# Patient Record
Sex: MALE | Race: WHITE | NOT HISPANIC OR LATINO | Employment: UNEMPLOYED | URBAN - METROPOLITAN AREA
[De-identification: names, ages, dates, MRNs, and addresses within clinical notes are randomized per-mention and may not be internally consistent; named-entity substitution may affect disease eponyms.]

---

## 2019-02-24 ENCOUNTER — OFFICE VISIT (OUTPATIENT)
Dept: URGENT CARE | Facility: PHYSICIAN GROUP | Age: 29
End: 2019-02-24
Payer: COMMERCIAL

## 2019-02-24 ENCOUNTER — HOSPITAL ENCOUNTER (OUTPATIENT)
Dept: RADIOLOGY | Facility: MEDICAL CENTER | Age: 29
End: 2019-02-24
Attending: PHYSICIAN ASSISTANT
Payer: COMMERCIAL

## 2019-02-24 VITALS
HEART RATE: 75 BPM | HEIGHT: 74 IN | OXYGEN SATURATION: 98 % | BODY MASS INDEX: 23.74 KG/M2 | RESPIRATION RATE: 16 BRPM | SYSTOLIC BLOOD PRESSURE: 124 MMHG | TEMPERATURE: 97.8 F | WEIGHT: 185 LBS | DIASTOLIC BLOOD PRESSURE: 80 MMHG

## 2019-02-24 DIAGNOSIS — M25.562 ACUTE PAIN OF LEFT KNEE: ICD-10-CM

## 2019-02-24 PROCEDURE — 73562 X-RAY EXAM OF KNEE 3: CPT | Mod: LT

## 2019-02-24 PROCEDURE — 99203 OFFICE O/P NEW LOW 30 MIN: CPT | Performed by: PHYSICIAN ASSISTANT

## 2019-02-24 RX ORDER — HYDROCODONE BITARTRATE AND ACETAMINOPHEN 5; 325 MG/1; MG/1
1 TABLET ORAL EVERY 6 HOURS PRN
Qty: 12 TAB | Refills: 0 | Status: SHIPPED | OUTPATIENT
Start: 2019-02-24 | End: 2019-03-03

## 2019-02-24 RX ORDER — METHYLPREDNISOLONE 4 MG/1
TABLET ORAL
Qty: 1 KIT | Refills: 0 | Status: SHIPPED | OUTPATIENT
Start: 2019-02-24 | End: 2019-02-24 | Stop reason: SDUPTHER

## 2019-02-24 RX ORDER — KETOROLAC TROMETHAMINE 30 MG/ML
60 INJECTION, SOLUTION INTRAMUSCULAR; INTRAVENOUS ONCE
Status: COMPLETED | OUTPATIENT
Start: 2019-02-24 | End: 2019-02-24

## 2019-02-24 RX ORDER — METHYLPREDNISOLONE 4 MG/1
TABLET ORAL
Qty: 1 KIT | Refills: 0 | Status: SHIPPED | OUTPATIENT
Start: 2019-02-24

## 2019-02-24 RX ADMIN — KETOROLAC TROMETHAMINE 60 MG: 30 INJECTION, SOLUTION INTRAMUSCULAR; INTRAVENOUS at 17:30

## 2019-02-24 ASSESSMENT — PAIN SCALES - GENERAL: PAINLEVEL: 6=MODERATE PAIN

## 2019-02-28 ASSESSMENT — ENCOUNTER SYMPTOMS
CHILLS: 0
DIZZINESS: 0
SHORTNESS OF BREATH: 0
BACK PAIN: 0
VOMITING: 0
ABDOMINAL PAIN: 0
WEAKNESS: 0
DIARRHEA: 0
FEVER: 0
NAUSEA: 0
NUMBNESS: 0

## 2019-02-28 NOTE — PROGRESS NOTES
"Subjective:      Dheeraj Fry is a 28 y.o. male who presents with Knee Pain (Left knee x 2 weeks)            Knee Pain   This is a new problem. The current episode started 1 to 4 weeks ago (2 weeks). The problem occurs constantly. The problem has been gradually worsening. Pertinent negatives include no abdominal pain, chest pain, chills, congestion, fever, nausea, numbness, rash, vomiting or weakness. The symptoms are aggravated by walking and standing. He has tried NSAIDs for the symptoms. The treatment provided mild relief.     Patient presents to urgent care reporting a 2 week history of worsening left knee pain. He denies any recent falls or trauma. No history of the same. No redness or swelling of the knee. He has been taking OTC nsaids without significant relief.     Review of Systems   Constitutional: Negative for chills and fever.   HENT: Negative for congestion.    Respiratory: Negative for shortness of breath.    Cardiovascular: Negative for chest pain.   Gastrointestinal: Negative for abdominal pain, diarrhea, nausea and vomiting.   Genitourinary: Negative.    Musculoskeletal: Negative for back pain and joint pain.        + knee pain   Skin: Negative for rash.   Neurological: Negative for dizziness, weakness and numbness.        Objective:     /80   Pulse 75   Temp 36.6 °C (97.8 °F) (Temporal)   Resp 16   Ht 1.88 m (6' 2\")   Wt 83.9 kg (185 lb)   SpO2 98%   BMI 23.75 kg/m²      Physical Exam   Constitutional: He is oriented to person, place, and time. He appears well-developed and well-nourished. He appears distressed.   HENT:   Head: Normocephalic and atraumatic.   Eyes: Pupils are equal, round, and reactive to light.   Cardiovascular: Normal rate.    Pulmonary/Chest: Effort normal.   Musculoskeletal: Normal range of motion.        Left knee: He exhibits normal range of motion, no swelling, no effusion, no LCL laxity and no MCL laxity. Tenderness found. No medial joint line and no lateral " joint line tenderness noted.        Legs:  Tenderness to palpation over anterior aspect of left knee.    Neurological: He is alert and oriented to person, place, and time.   Skin: He is not diaphoretic.   Psychiatric: He has a normal mood and affect. His behavior is normal.   Nursing note and vitals reviewed.         PMH:  has no past medical history on file.  MEDS:   Current Outpatient Prescriptions:   •  HYDROcodone-acetaminophen (NORCO) 5-325 MG Tab per tablet, Take 1 Tab by mouth every 6 hours as needed for up to 7 days., Disp: 12 Tab, Rfl: 0  •  MethylPREDNISolone (MEDROL DOSEPAK) 4 MG Tablet Therapy Pack, Take as directed, Disp: 1 Kit, Rfl: 0  •  Loratadine (CLARITIN PO), Take  by mouth., Disp: , Rfl:   •  azithromycin (ZITHROMAX) 250 MG Tab, Take 2 tablets on day 1 then 1 tablet on day 2, 3, 4, 5, Disp: 6 Tab, Rfl: 0  •  guaifenesin-codeine (TUSSI-ORGANIDIN NR) 100-10 MG/5ML syrup, Take 5 mL by mouth 3 times a day as needed for Cough., Disp: 120 mL, Rfl: 0  ALLERGIES: No Known Allergies  SURGHX: History reviewed. No pertinent surgical history.  SOCHX:  reports that he has been smoking Cigarettes.  He has a 2.50 pack-year smoking history. He has never used smokeless tobacco. He reports that he drinks about 4.2 - 8.4 oz of alcohol per week . He reports that he uses drugs, including Marijuana.  FH: family history is not on file.     Assessment/Plan:     1. Acute pain of left knee  - ketorolac (TORADOL) injection 60 mg; 2 mL by Intramuscular route Once.   - Given in urgent care with moderate relief of pain after 10 minutes  - DX-KNEE 3 VIEWS LEFT; Future  Impression       No evidence of acute fracture or dislocation.       - HYDROcodone-acetaminophen (NORCO) 5-325 MG Tab per tablet; Take 1 Tab by mouth every 6 hours as needed for up to 7 days.  Dispense: 12 Tab; Refill: 0  - Consent for Opiate Prescription  - MethylPREDNISolone (MEDROL DOSEPAK) 4 MG Tablet Therapy Pack; Take as directed  Dispense: 1 Kit; Refill:  0    Encouraged icing 2-3 times daily and use of compressive knee brace as needed. OTC tylenol as needed for mild-moderate pain while taking oral course of steroids. Norco given to take for severe pain. He states he has already set up an appointment with [facility] orthopedics and will follow up next week if needed. The patient demonstrated a good understanding and agreed with the treatment plan.

## 2019-03-10 ENCOUNTER — HOSPITAL ENCOUNTER (OUTPATIENT)
Dept: RADIOLOGY | Facility: MEDICAL CENTER | Age: 29
End: 2019-03-10
Attending: NEUROLOGICAL SURGERY
Payer: COMMERCIAL

## 2019-03-10 DIAGNOSIS — M54.5 LOW BACK PAIN, UNSPECIFIED BACK PAIN LATERALITY, UNSPECIFIED CHRONICITY, WITH SCIATICA PRESENCE UNSPECIFIED: ICD-10-CM

## 2019-03-10 PROCEDURE — 72100 X-RAY EXAM L-S SPINE 2/3 VWS: CPT

## 2019-04-18 ENCOUNTER — HOSPITAL ENCOUNTER (OUTPATIENT)
Dept: RADIOLOGY | Facility: MEDICAL CENTER | Age: 29
End: 2019-04-18
Attending: PHYSICIAN ASSISTANT
Payer: COMMERCIAL

## 2019-04-18 DIAGNOSIS — M54.16 LUMBAR RADICULOPATHY: ICD-10-CM

## 2019-04-18 PROCEDURE — 72148 MRI LUMBAR SPINE W/O DYE: CPT

## 2019-04-18 PROCEDURE — 72100 X-RAY EXAM L-S SPINE 2/3 VWS: CPT

## 2022-09-17 ENCOUNTER — APPOINTMENT (EMERGENCY)
Dept: RADIOLOGY | Facility: HOSPITAL | Age: 32
End: 2022-09-17
Attending: EMERGENCY MEDICINE
Payer: COMMERCIAL

## 2022-09-17 ENCOUNTER — HOSPITAL ENCOUNTER (EMERGENCY)
Facility: HOSPITAL | Age: 32
Discharge: HOME | End: 2022-09-17
Attending: EMERGENCY MEDICINE | Admitting: EMERGENCY MEDICINE
Payer: COMMERCIAL

## 2022-09-17 VITALS
HEART RATE: 70 BPM | OXYGEN SATURATION: 99 % | RESPIRATION RATE: 16 BRPM | DIASTOLIC BLOOD PRESSURE: 83 MMHG | SYSTOLIC BLOOD PRESSURE: 122 MMHG | TEMPERATURE: 98.5 F

## 2022-09-17 DIAGNOSIS — J01.00 ACUTE MAXILLARY SINUSITIS, RECURRENCE NOT SPECIFIED: Primary | ICD-10-CM

## 2022-09-17 LAB
ALBUMIN SERPL-MCNC: 5.1 G/DL (ref 3.4–5)
ALP SERPL-CCNC: 38 IU/L (ref 35–126)
ALT SERPL-CCNC: 16 IU/L (ref 16–63)
ANION GAP SERPL CALC-SCNC: 8 MEQ/L (ref 3–15)
AST SERPL-CCNC: 19 IU/L (ref 15–41)
BASOPHILS # BLD: 0.06 K/UL (ref 0.01–0.1)
BASOPHILS NFR BLD: 0.7 %
BILIRUB SERPL-MCNC: 0.8 MG/DL (ref 0.3–1.2)
BUN SERPL-MCNC: 15 MG/DL (ref 8–20)
CALCIUM SERPL-MCNC: 9.7 MG/DL (ref 8.9–10.3)
CHLORIDE SERPL-SCNC: 101 MEQ/L (ref 98–109)
CO2 SERPL-SCNC: 25 MEQ/L (ref 22–32)
CREAT SERPL-MCNC: 0.7 MG/DL (ref 0.8–1.3)
DIFFERENTIAL METHOD BLD: ABNORMAL
EOSINOPHIL # BLD: 0.19 K/UL (ref 0.04–0.54)
EOSINOPHIL NFR BLD: 2.4 %
ERYTHROCYTE [DISTWIDTH] IN BLOOD BY AUTOMATED COUNT: 11.8 % (ref 11.6–14.4)
GFR SERPL CREATININE-BSD FRML MDRD: >60 ML/MIN/1.73M*2
GLUCOSE SERPL-MCNC: 95 MG/DL (ref 70–99)
HCT VFR BLDCO AUTO: 45.7 % (ref 40.1–51)
HGB BLD-MCNC: 15.7 G/DL (ref 13.7–17.5)
IMM GRANULOCYTES # BLD AUTO: 0.02 K/UL (ref 0–0.08)
IMM GRANULOCYTES NFR BLD AUTO: 0.2 %
LYMPHOCYTES # BLD: 2.01 K/UL (ref 1.2–3.5)
LYMPHOCYTES NFR BLD: 25.1 %
MCH RBC QN AUTO: 33.7 PG (ref 28–33.2)
MCHC RBC AUTO-ENTMCNC: 34.4 G/DL (ref 32.2–36.5)
MCV RBC AUTO: 98.1 FL (ref 83–98)
MONOCYTES # BLD: 0.61 K/UL (ref 0.3–1)
MONOCYTES NFR BLD: 7.6 %
NEUTROPHILS # BLD: 5.12 K/UL (ref 1.7–7)
NEUTS SEG NFR BLD: 64 %
NRBC BLD-RTO: 0 %
PDW BLD AUTO: 11.2 FL (ref 9.4–12.4)
PLATELET # BLD AUTO: 173 K/UL (ref 150–350)
POTASSIUM SERPL-SCNC: 3.9 MEQ/L (ref 3.6–5.1)
PROT SERPL-MCNC: 7.9 G/DL (ref 6–8.2)
RBC # BLD AUTO: 4.66 M/UL (ref 4.5–5.8)
SODIUM SERPL-SCNC: 134 MEQ/L (ref 136–144)
WBC # BLD AUTO: 8.01 K/UL (ref 3.8–10.5)

## 2022-09-17 PROCEDURE — G1004 CDSM NDSC: HCPCS

## 2022-09-17 PROCEDURE — 36415 COLL VENOUS BLD VENIPUNCTURE: CPT | Performed by: PHYSICIAN ASSISTANT

## 2022-09-17 PROCEDURE — 99284 EMERGENCY DEPT VISIT MOD MDM: CPT

## 2022-09-17 PROCEDURE — 80053 COMPREHEN METABOLIC PANEL: CPT | Performed by: PHYSICIAN ASSISTANT

## 2022-09-17 PROCEDURE — 85025 COMPLETE CBC W/AUTO DIFF WBC: CPT | Performed by: PHYSICIAN ASSISTANT

## 2022-09-17 RX ORDER — POLYMYXIN B SULFATE AND TRIMETHOPRIM 1; 10000 MG/ML; [USP'U]/ML
1 SOLUTION OPHTHALMIC
Qty: 10 ML | Refills: 0 | Status: SHIPPED | OUTPATIENT
Start: 2022-09-17 | End: 2022-09-24

## 2022-09-17 RX ORDER — AMOXICILLIN AND CLAVULANATE POTASSIUM 875; 125 MG/1; MG/1
1 TABLET, FILM COATED ORAL 2 TIMES DAILY
Qty: 20 TABLET | Refills: 0 | Status: SHIPPED | OUTPATIENT
Start: 2022-09-17 | End: 2022-09-27

## 2022-09-17 ASSESSMENT — ENCOUNTER SYMPTOMS
WEAKNESS: 0
FEVER: 0
EYE REDNESS: 0
RHINORRHEA: 0
PHOTOPHOBIA: 0
LIGHT-HEADEDNESS: 0
EYE PAIN: 1
EYE DISCHARGE: 1
COLOR CHANGE: 0

## 2022-09-17 ASSESSMENT — VISUAL ACUITY: OU: 1

## 2022-09-18 NOTE — ED PROVIDER NOTES
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 States he developed some tenderness and mild swelling below his eye and went to  who instructed pt to come to ED for a CT scan  Relieved by:  Nothing tried   Worsened by:  Palpation of area   Associated symptoms: no congestion, no fever, no rash and no rhinorrhea          Patient History   PAST HISTORY     Reviewed from Nursing Triage:         History reviewed. No pertinent past medical history.    Past Surgical History:   Procedure Laterality Date    FACIAL RECONSTRUCTION SURGERY Left 2008       History reviewed. No pertinent family history.    Social History     Tobacco Use    Smoking status: Current Every Day Smoker     Packs/day: 1.00    Smokeless tobacco: Former User   Substance Use Topics    Alcohol use: Yes     Comment: wine daily    Drug use: Not Currently         Review of Systems   REVIEW OF SYSTEMS     Review of Systems   Constitutional: Negative for fever.   HENT: Negative for congestion and rhinorrhea.    Eyes: Positive for pain and discharge. Negative for photophobia, redness and visual disturbance.   Skin: Negative for color change and rash.   Neurological: Negative for weakness and light-headedness.         VITALS     ED Vitals    Date/Time Temp Pulse Resp BP SpO2 [facility]   09/17/22 1747 36.9 °C (98.5 °F) 77 16 123/82 99 % MK        Pulse Ox %: 99 % (09/17/22 2018)  Pulse Ox Interpretation: Normal (09/17/22 2018)           Physical Exam   PHYSICAL EXAM     Physical Exam  Constitutional:       Appearance: He is not ill-appearing or toxic-appearing.   HENT:      Head: Normocephalic.   Eyes:      General: Lids are normal. Vision grossly intact.          Left eye: No discharge.      Extraocular Movements: Extraocular movements intact.      Left eye: Normal extraocular motion and no nystagmus.      Conjunctiva/sclera: Conjunctivae normal.      Left eye: Left conjunctiva is not injected. No chemosis or exudate.     Pupils: Pupils are equal, round, and reactive to light.      Comments: Mild infraorbital edema    Cardiovascular:      Rate and Rhythm: Normal rate and regular rhythm.   Pulmonary:      Effort: Pulmonary effort is normal. No respiratory distress.   Skin:     General: Skin is warm and dry.      Findings: No rash.   Neurological:      General: No focal deficit present.      Mental Status: He is alert and oriented to person, place, and time.           PROCEDURES     Procedures     DATA     Results     Procedure Component Value Units Date/Time    Comprehensive metabolic panel [[id number]]  (Abnormal) Collected: 09/17/22 1803    Specimen: Blood, Venous Updated: 09/17/22 1841     Sodium 134 mEQ/L      Potassium 3.9 mEQ/L      Comment: Results obtained on plasma. Plasma Potassium values may be up to 0.4 mEQ/L less than serum values. The differences may be greater for patients with high platelet or white cell counts.        Chloride 101 mEQ/L      CO2 25 mEQ/L      BUN 15 mg/dL      Creatinine 0.7 mg/dL      Glucose 95 mg/dL      Calcium 9.7 mg/dL      AST (SGOT) 19 IU/L      ALT (SGPT) 16 IU/L      Alkaline Phosphatase 38 IU/L      Total Protein 7.9 g/dL      Comment: Test performed on plasma which typically contains approximately 0.4 g/dL more protein than serum.        Albumin 5.1 g/dL      Bilirubin, Total 0.8 mg/dL      eGFR >60.0 mL/min/1.73m*2      Anion Gap 8 mEQ/L     CBC and differential [[id number]]  (Abnormal) Collected: 09/17/22 1803    Specimen: Blood, Venous Updated: 09/17/22 1808     WBC 8.01 K/uL      RBC 4.66 M/uL      Hemoglobin 15.7 g/dL      Hematocrit 45.7 %      MCV 98.1 fL      MCH 33.7 pg      MCHC 34.4 g/dL      RDW 11.8 %       Platelets 173 K/uL      MPV 11.2 fL      Differential Type Auto     nRBC 0.0 %      Immature Granulocytes 0.2 %      Neutrophils 64.0 %      Lymphocytes 25.1 %      Monocytes 7.6 %      Eosinophils 2.4 %      Basophils 0.7 %      Immature Granulocytes, Absolute 0.02 K/uL      Neutrophils, Absolute 5.12 K/uL      Lymphocytes, Absolute 2.01 K/uL      Monocytes, Absolute 0.61 K/uL      Eosinophils, Absolute 0.19 K/uL      Basophils, Absolute 0.06 K/uL           Imaging Results          CT FACIAL BONES WITHOUT IV CONTRAST (Final result)  Result time 09/17/22 19:27:55    Final result                 Impression:    IMPRESSION:  Mild mucosal thickening left maxillary sinus.  Otherwise, no acute abnormality appreciated.                       Narrative:    CLINICAL HISTORY:  Maxillofacial pain  left infraorbital pain and swelling        TECHNIQUE:  Helical acquisition of the Max face without intravenous contrast.  Coronal and sagittal reformats also obtained.      CT DOSE:  One or more dose reduction techniques (e.g. automated exposure  control, adjustment of the mA and/or kV according to patient size, use of  iterative reconstruction technique) utilized for this examination    COMPARISON: None    FINDINGS:    Streak artifact from dental amalgam as well as left orbital an maxillary  surgical hardware.  Hardware is intact.  No evidence of an acute fracture.  Alignment within normal limits.  There is mild mucosal thickening left maxillary sinus.  Paranasal sinuses  otherwise clear.  No acute abnormality either orbit.  No collection.  No evidence of a mass.                                  No orders to display       Scoring tools                                  ED Course & MDM   MDM / ED COURSE / CLINICAL IMPRESSION / DISPO     MDM       Clinical Impression      Acute maxillary sinusitis, recurrence not specified     Disposition  Discharge         Gladys Wright PA C  09/17/22 2025

## 2022-09-18 NOTE — ED ATTESTATION NOTE
The patient was evaluated and managed by the physician assistant / nurse practitioner.     Ashley Quiroz, DO  09/18/22 1247

## 2025-04-11 ENCOUNTER — HOSPITAL ENCOUNTER (EMERGENCY)
Facility: HOSPITAL | Age: 35
Discharge: HOME | End: 2025-04-11
Attending: EMERGENCY MEDICINE | Admitting: EMERGENCY MEDICINE
Payer: COMMERCIAL

## 2025-04-11 VITALS
TEMPERATURE: 98 F | RESPIRATION RATE: 18 BRPM | HEIGHT: 72 IN | DIASTOLIC BLOOD PRESSURE: 88 MMHG | SYSTOLIC BLOOD PRESSURE: 140 MMHG | HEART RATE: 87 BPM | WEIGHT: 200 LBS | BODY MASS INDEX: 27.09 KG/M2 | OXYGEN SATURATION: 97 %

## 2025-04-11 DIAGNOSIS — L02.01 FACIAL ABSCESS: Primary | ICD-10-CM

## 2025-04-11 PROCEDURE — 0H91XZZ DRAINAGE OF FACE SKIN, EXTERNAL APPROACH: ICD-10-PCS | Performed by: EMERGENCY MEDICINE

## 2025-04-11 PROCEDURE — 10060 I&D ABSCESS SIMPLE/SINGLE: CPT

## 2025-04-11 PROCEDURE — 63700000 HC SELF-ADMINISTRABLE DRUG

## 2025-04-11 PROCEDURE — 99283 EMERGENCY DEPT VISIT LOW MDM: CPT | Mod: 25

## 2025-04-11 RX ORDER — CEPHALEXIN 500 MG/1
500 CAPSULE ORAL ONCE
Status: COMPLETED | OUTPATIENT
Start: 2025-04-11 | End: 2025-04-11

## 2025-04-11 RX ORDER — MELOXICAM 15 MG/1
15 TABLET ORAL
COMMUNITY
Start: 2024-12-16

## 2025-04-11 RX ORDER — CEPHALEXIN 500 MG/1
500 CAPSULE ORAL 4 TIMES DAILY
Qty: 28 CAPSULE | Refills: 0 | Status: SHIPPED | OUTPATIENT
Start: 2025-04-11 | End: 2025-04-18

## 2025-04-11 RX ORDER — CYCLOBENZAPRINE HCL 5 MG
5 TABLET ORAL
COMMUNITY
Start: 2024-12-16

## 2025-04-11 RX ADMIN — CEPHALEXIN 500 MG: 500 CAPSULE ORAL at 15:26

## 2025-04-11 ASSESSMENT — ENCOUNTER SYMPTOMS
WOUND: 1
BRUISES/BLEEDS EASILY: 0
CHILLS: 0
FEVER: 0

## 2025-04-11 NOTE — DISCHARGE INSTRUCTIONS
You are prescribed antibiotics, please finish out the full course of these antibiotics.    Return to the ED for worsening of symptoms or any problems or concerns.  It is very important to follow up with your healthcare provider for re-evaluation.

## 2025-04-11 NOTE — ED ATTESTATION NOTE
I have personally seen and examined the patient.  I reviewed and agree with physician assistant / nurse practitioners assessment and plan of care, with the following exceptions: None    I personally performed the key components of the encounter and provided a substantive portion of the care and medical decision making    My examination, assessment, and plan of care of Juan Carlos Sigala is as follows:     Pt has had a small lump under left eye since surgery years ago.  Over the last few days, has been more swollen.  Exam: 4 to 5 mm pustule under left eye.       Sabas Díaz MD  04/11/25 8790

## 2025-04-11 NOTE — ED PROVIDER NOTES
Emergency Medicine Note  HPI   HISTORY OF PRESENT ILLNESS     HPI  34-year-old male presents to the ED for evaluation of facial wound.  Patient reports he had reconstructive surgery on the left side of his face.  Developed a lump on his left cheek following the surgery which he reports he has always had.  Over the last 4 days he reports it has become more inflamed and now looks infected.  Denies fevers or chills.      Patient History   PAST HISTORY     Reviewed from Nursing Triage:       History reviewed. No pertinent past medical history.    Past Surgical History   Procedure Laterality Date    Facial reconstruction surgery Left 2008       No family history on file.    Social History     Tobacco Use    Smoking status: Every Day     Current packs/day: 1.00     Types: Cigarettes    Smokeless tobacco: Former   Substance Use Topics    Alcohol use: Yes     Comment: wine daily    Drug use: Not Currently         Review of Systems   REVIEW OF SYSTEMS     Review of Systems   Constitutional:  Negative for chills and fever.   Skin:  Positive for wound.   Allergic/Immunologic: Negative for immunocompromised state.   Hematological:  Does not bruise/bleed easily.         VITALS     ED Vitals      Date/Time Temp Pulse Resp BP SpO2 [facility]   04/11/25 1424 36.7 °C (98 °F) 87 18 140/88 97 % SK          Pulse Ox %: 97 % (04/11/25 1507)  Pulse Ox Interpretation: Normal (04/11/25 1507)           Physical Exam   PHYSICAL EXAM     Physical Exam  Vitals and nursing note reviewed.   Constitutional:       General: He is not in acute distress.     Appearance: Normal appearance. He is normal weight. He is not ill-appearing, toxic-appearing or diaphoretic.   HENT:      Head: Normocephalic and atraumatic.   Eyes:      Extraocular Movements: Extraocular movements intact.      Pupils: Pupils are equal, round, and reactive to light.   Cardiovascular:      Rate and Rhythm: Normal rate and regular rhythm.      Pulses: Normal pulses.      Heart sounds:  Normal heart sounds.   Pulmonary:      Effort: Pulmonary effort is normal. No respiratory distress.      Breath sounds: Normal breath sounds.   Skin:     General: Skin is warm and dry.      Capillary Refill: Capillary refill takes less than 2 seconds.      Comments: 1 cm abscess to left cheek 1 inch below lower eyelid   Neurological:      Mental Status: He is alert.           PROCEDURES     Incision and Drainage    Date/Time: 4/11/2025 3:17 PM    Performed by: Francisca Herbert PA C  Authorized by: Sabas Díaz MD    Consent:     Consent obtained:  Verbal    Consent given by:  Patient    Risks, benefits, and alternatives were discussed: yes    Universal protocol:     Patient identity confirmed:  Verbally with patient and arm band  Location:     Type:  Abscess    Size:  1 cm    Location:  Head    Head location:  Face  Pre-procedure details:     Skin preparation:  Povidone-iodine  Sedation:     Sedation type:  None  Anesthesia:     Anesthesia method:  Local infiltration    Local anesthetic:  Lidocaine 1% w/o epi  Procedure type:     Complexity:  Simple  Procedure details:     Incision types:  Single straight    Wound management:  Extensive cleaning    Drainage:  Purulent    Drainage amount:  Moderate    Packing materials:  None  Post-procedure details:     Procedure completion:  Tolerated well, no immediate complications       DATA     Results       None            Imaging Results    None         No orders to display       Scoring tools                                  ED Course & MDM   MDM / ED COURSE / CLINICAL IMPRESSION / DISPO     Medical Decision Making  34-year-old male presents to the ED for evaluation of facial wound.  Patient found to have abscess.  I&D performed.  Patient started on antibiotics.  Patient stable for discharge home with outpatient follow-up.  Return precautions given.  All questions answered.  Patient recommended to follow-up outpatient for further evaluation and management.  Patient  expressed understanding and agreement with plan.       Risk  Prescription drug management.        ED Course as of 04/11/25 1517   Fri Apr 11, 2025   1510 I&D performed [VR]      ED Course User Index  [VR] Francisca Herbert PA C     Clinical Impression      Facial abscess     _________________       ED Disposition   Discharge                       Francisca Herbert PA C  04/11/25 1517